# Patient Record
Sex: FEMALE | Race: WHITE | HISPANIC OR LATINO | Employment: UNEMPLOYED | URBAN - METROPOLITAN AREA
[De-identification: names, ages, dates, MRNs, and addresses within clinical notes are randomized per-mention and may not be internally consistent; named-entity substitution may affect disease eponyms.]

---

## 2019-07-28 ENCOUNTER — HOSPITAL ENCOUNTER (EMERGENCY)
Facility: HOSPITAL | Age: 69
Discharge: HOME | End: 2019-07-28
Attending: EMERGENCY MEDICINE
Payer: MEDICAID

## 2019-07-28 VITALS
WEIGHT: 155 LBS | SYSTOLIC BLOOD PRESSURE: 185 MMHG | BODY MASS INDEX: 27.46 KG/M2 | TEMPERATURE: 99 F | RESPIRATION RATE: 18 BRPM | HEIGHT: 63 IN | OXYGEN SATURATION: 100 % | DIASTOLIC BLOOD PRESSURE: 81 MMHG | HEART RATE: 76 BPM

## 2019-07-28 DIAGNOSIS — I10 ELEVATED BLOOD PRESSURE READING WITH DIAGNOSIS OF HYPERTENSION: ICD-10-CM

## 2019-07-28 DIAGNOSIS — R30.0 DYSURIA: Primary | ICD-10-CM

## 2019-07-28 LAB
ALBUMIN SERPL BCP-MCNC: 4.4 G/DL (ref 3.5–5.2)
ALP SERPL-CCNC: 147 U/L (ref 55–135)
ALT SERPL W/O P-5'-P-CCNC: 26 U/L (ref 10–44)
ANION GAP SERPL CALC-SCNC: 14 MMOL/L (ref 8–16)
AST SERPL-CCNC: 29 U/L (ref 10–40)
BACTERIA #/AREA URNS HPF: ABNORMAL /HPF
BASOPHILS # BLD AUTO: 0.02 K/UL (ref 0–0.2)
BASOPHILS NFR BLD: 0.3 % (ref 0–1.9)
BILIRUB SERPL-MCNC: 0.3 MG/DL (ref 0.1–1)
BILIRUB UR QL STRIP: NEGATIVE
BUN SERPL-MCNC: 11 MG/DL (ref 8–23)
CALCIUM SERPL-MCNC: 10.2 MG/DL (ref 8.7–10.5)
CHLORIDE SERPL-SCNC: 98 MMOL/L (ref 95–110)
CLARITY UR: CLEAR
CO2 SERPL-SCNC: 29 MMOL/L (ref 23–29)
COLOR UR: YELLOW
CREAT SERPL-MCNC: 0.7 MG/DL (ref 0.5–1.4)
DIFFERENTIAL METHOD: ABNORMAL
EOSINOPHIL # BLD AUTO: 0.1 K/UL (ref 0–0.5)
EOSINOPHIL NFR BLD: 0.8 % (ref 0–8)
ERYTHROCYTE [DISTWIDTH] IN BLOOD BY AUTOMATED COUNT: 12.8 % (ref 11.5–14.5)
EST. GFR  (AFRICAN AMERICAN): >60 ML/MIN/1.73 M^2
EST. GFR  (NON AFRICAN AMERICAN): >60 ML/MIN/1.73 M^2
GLUCOSE SERPL-MCNC: 132 MG/DL (ref 70–110)
GLUCOSE UR QL STRIP: NEGATIVE
HCT VFR BLD AUTO: 44.5 % (ref 37–48.5)
HGB BLD-MCNC: 13.9 G/DL (ref 12–16)
HGB UR QL STRIP: ABNORMAL
KETONES UR QL STRIP: NEGATIVE
LEUKOCYTE ESTERASE UR QL STRIP: NEGATIVE
LYMPHOCYTES # BLD AUTO: 2.2 K/UL (ref 1–4.8)
LYMPHOCYTES NFR BLD: 27.8 % (ref 18–48)
MCH RBC QN AUTO: 29.1 PG (ref 27–31)
MCHC RBC AUTO-ENTMCNC: 31.2 G/DL (ref 32–36)
MCV RBC AUTO: 93 FL (ref 82–98)
MICROSCOPIC COMMENT: ABNORMAL
MONOCYTES # BLD AUTO: 0.5 K/UL (ref 0.3–1)
MONOCYTES NFR BLD: 6 % (ref 4–15)
NEUTROPHILS # BLD AUTO: 5.2 K/UL (ref 1.8–7.7)
NEUTROPHILS NFR BLD: 65.1 % (ref 38–73)
NITRITE UR QL STRIP: NEGATIVE
PH UR STRIP: 7 [PH] (ref 5–8)
PLATELET # BLD AUTO: 413 K/UL (ref 150–350)
PMV BLD AUTO: 8.7 FL (ref 9.2–12.9)
POTASSIUM SERPL-SCNC: 4 MMOL/L (ref 3.5–5.1)
PROT SERPL-MCNC: 7.6 G/DL (ref 6–8.4)
PROT UR QL STRIP: NEGATIVE
RBC # BLD AUTO: 4.78 M/UL (ref 4–5.4)
RBC #/AREA URNS HPF: 6 /HPF (ref 0–4)
SODIUM SERPL-SCNC: 141 MMOL/L (ref 136–145)
SP GR UR STRIP: 1.01 (ref 1–1.03)
URN SPEC COLLECT METH UR: ABNORMAL
UROBILINOGEN UR STRIP-ACNC: NEGATIVE EU/DL
WBC # BLD AUTO: 7.98 K/UL (ref 3.9–12.7)
WBC #/AREA URNS HPF: 0 /HPF (ref 0–5)

## 2019-07-28 PROCEDURE — 80053 COMPREHEN METABOLIC PANEL: CPT

## 2019-07-28 PROCEDURE — 25000003 PHARM REV CODE 250: Performed by: NURSE PRACTITIONER

## 2019-07-28 PROCEDURE — 96374 THER/PROPH/DIAG INJ IV PUSH: CPT

## 2019-07-28 PROCEDURE — 63600175 PHARM REV CODE 636 W HCPCS: Performed by: NURSE PRACTITIONER

## 2019-07-28 PROCEDURE — 99284 EMERGENCY DEPT VISIT MOD MDM: CPT | Mod: 25

## 2019-07-28 PROCEDURE — 85025 COMPLETE CBC W/AUTO DIFF WBC: CPT

## 2019-07-28 PROCEDURE — 81000 URINALYSIS NONAUTO W/SCOPE: CPT

## 2019-07-28 PROCEDURE — 96361 HYDRATE IV INFUSION ADD-ON: CPT

## 2019-07-28 RX ORDER — KETOROLAC TROMETHAMINE 30 MG/ML
15 INJECTION, SOLUTION INTRAMUSCULAR; INTRAVENOUS
Status: COMPLETED | OUTPATIENT
Start: 2019-07-28 | End: 2019-07-28

## 2019-07-28 RX ORDER — CEPHALEXIN 500 MG/1
500 CAPSULE ORAL EVERY 6 HOURS
Qty: 28 CAPSULE | Refills: 0 | Status: SHIPPED | OUTPATIENT
Start: 2019-07-28 | End: 2019-08-04

## 2019-07-28 RX ORDER — TRAMADOL HYDROCHLORIDE 50 MG/1
50 TABLET ORAL EVERY 6 HOURS PRN
Qty: 12 TABLET | Refills: 0 | Status: SHIPPED | OUTPATIENT
Start: 2019-07-28 | End: 2019-08-07

## 2019-07-28 RX ORDER — PHENAZOPYRIDINE HYDROCHLORIDE 100 MG/1
200 TABLET, FILM COATED ORAL
Status: COMPLETED | OUTPATIENT
Start: 2019-07-28 | End: 2019-07-28

## 2019-07-28 RX ADMIN — KETOROLAC TROMETHAMINE 15 MG: 30 INJECTION, SOLUTION INTRAMUSCULAR at 12:07

## 2019-07-28 RX ADMIN — SODIUM CHLORIDE 1000 ML: 0.9 INJECTION, SOLUTION INTRAVENOUS at 12:07

## 2019-07-28 RX ADMIN — PHENAZOPYRIDINE HYDROCHLORIDE 200 MG: 100 TABLET ORAL at 11:07

## 2019-07-28 NOTE — DISCHARGE INSTRUCTIONS
Take all of prescribed antibiotic until complete.  Take prescribed pain medication as labeled as needed for pain. Follow-up with Urology in 2-3 days return to ED for any concerns or worsening symptoms.  Also please follow up with [facility] in approximately 2-3 days for blood pressure recheck and keep a daily recording of blood pressure readings.

## 2019-07-28 NOTE — ED TRIAGE NOTES
Patient's father Marcus picked prescription up 11.27 at 11:45am   Pt presents to ED with C/O pain and burning on urination with onset x7 days ago and vaginal bleeding with pelvic pain. Pt reports that she was diagnoses with a vaginal cystocele 5 years ago. Pt is Occitan speaking and wants her friend with her to translate and refuses  provided by the hospital.

## 2019-07-28 NOTE — ED PROVIDER NOTES
Encounter Date: 7/28/2019       History     Chief Complaint   Patient presents with    Urinary Tract Infection     Pt reports that she has blood in her urine, painful urination, pelvic pain. Pt reports that she was diagnoses with a vaginal cystocele 5 years ago. Pt is Malagasy speaking and wants her friend with her to translate and refuses  provided by the hospital.      Patient is 68-year-old female past medical history of hypertension who presents ED for evaluation of urinary tract infection for approximately a week.  Patient denies vaginal bleeding as previously reported to nurse.  Patient does report that there was blood in the toilet after she urinated yesterday but is unsure where it came from and states that it has resolved at this time.  Patient states that she was diagnosed with vaginal cyst is still approximately 5 years ago in [place].  Patient also reports history of kidney stones.  Associates lower abdominal pain, pelvic pain, decreased urine volume, hematuria, and difficulty urinating.  Denies vaginal pain/odor/discharge.  Patient reports taking azo last week with no improvement and states that she was taking her friend's amoxicillin.  Patient also reports that she has been taking her friend's losartan for blood pressure and did take some this morning but is unsure of the dose.  Denies chest pain or shortness of breath.  Denies any alleviating or exacerbating factors.  Denies fever, chills, neck pain/stiffness, N/V/D, back pain, or any other concerns.    The history is provided by the patient and a relative. The history is limited by a language barrier. A  was used (Ukrainian speaking, Professional  offered, patient declines and wants family member to interpret.).     Review of patient's allergies indicates:  No Known Allergies  Past Medical History:   Diagnosis Date    Hypertension      Past Surgical History:   Procedure Laterality Date    TUBAL LIGATION        History reviewed. No pertinent family history.  Social History     Tobacco Use    Smoking status: Never Smoker    Smokeless tobacco: Never Used   Substance Use Topics    Alcohol use: Never     Frequency: Never    Drug use: Never     Review of Systems   Constitutional: Negative for chills and fever.   Respiratory: Negative for shortness of breath.    Cardiovascular: Negative for chest pain.   Gastrointestinal: Positive for abdominal pain (lower). Negative for diarrhea, nausea and vomiting.   Genitourinary: Positive for decreased urine volume, difficulty urinating, dysuria, hematuria and pelvic pain. Negative for vaginal bleeding, vaginal discharge and vaginal pain.   Musculoskeletal: Negative for back pain, neck pain and neck stiffness.   Hematological: Does not bruise/bleed easily.   All other systems reviewed and are negative.      Physical Exam     Initial Vitals [07/28/19 1039]   BP Pulse Resp Temp SpO2   (!) 190/104 93 18 98.6 °F (37 °C) 99 %      MAP       --         Physical Exam    Vitals reviewed.  Constitutional: She appears well-developed and well-nourished.  Non-toxic appearance. She does not have a sickly appearance.   HENT:   Head: Atraumatic.   Mouth/Throat: Oropharynx is clear and moist.   Eyes: EOM are normal.   Neck: Normal range of motion, full passive range of motion without pain and phonation normal. Neck supple.   Cardiovascular: Regular rhythm.   Asymptomatic hypertension.   Pulmonary/Chest: No respiratory distress.   Abdominal: Soft. Normal appearance and bowel sounds are normal. She exhibits no distension. There is tenderness (mild) in the suprapubic area. There is no rigidity, no rebound, no guarding and no CVA tenderness.   Musculoskeletal:   Back non-tender to palpation.   Neurological: She is alert and oriented to person, place, and time.   Skin: Skin is warm. No rash noted.   Psychiatric: She has a normal mood and affect.         ED Course   Procedures  Labs Reviewed    URINALYSIS, REFLEX TO URINE CULTURE - Abnormal; Notable for the following components:       Result Value    Occult Blood UA 1+ (*)     All other components within normal limits    Narrative:     Preferred Collection Type->Urine, Clean Catch   URINALYSIS MICROSCOPIC - Abnormal; Notable for the following components:    RBC, UA 6 (*)     All other components within normal limits    Narrative:     Preferred Collection Type->Urine, Clean Catch   CBC W/ AUTO DIFFERENTIAL - Abnormal; Notable for the following components:    Mean Corpuscular Hemoglobin Conc 31.2 (*)     Platelets 413 (*)     MPV 8.7 (*)     All other components within normal limits   COMPREHENSIVE METABOLIC PANEL - Abnormal; Notable for the following components:    Glucose 132 (*)     Alkaline Phosphatase 147 (*)     All other components within normal limits          Imaging Results          CT Renal Stone Study ABD Pelvis WO (Final result)  Result time 07/28/19 12:25:02    Final result by Brad Mason MD (07/28/19 12:25:02)                 Impression:      1. Left nonobstructive nephrolithiasis, no findings to suggest obstructive uropathy.  The urinary bladder is distended without wall thickening, correlation with any history of urinary retention or outlet obstruction recommended.  2. Possible high protein content or hemorrhagic cyst arising from the interpolar region/lower pole of the left kidney, ultrasound could be performed on a nonemergent basis to confirm cystic nature as warranted.  3. Hepatic steatosis, correlation with LFTs advised.  4. 2-3 mm pulmonary nodule within the right upper lobe.  For a solid nodule <6 mm, Fleischner Society 2017 guidelines recommend no routine follow up for a low risk patient, or follow-up with non-contrast chest CT at 12 months in a high risk patient.  5. Several additional findings above.      Electronically signed by: Brad Mason MD  Date:    07/28/2019  Time:    12:25             Narrative:     EXAMINATION:  CT RENAL STONE STUDY ABD PELVIS WO    CLINICAL HISTORY:  Hematuria;    TECHNIQUE:  Low dose axial images, sagittal and coronal reformations were obtained from the lung bases to the pubic symphysis.  Contrast was not administered.    COMPARISON:  None    FINDINGS:  Images of the lower thorax are remarkable for a 2-3 mm pulmonary nodule within the right upper lobe.    The liver is hypoattenuating, suggesting steatosis, correlation with LFTs recommended.  There is fatty sparing about the gallbladder fossa.  The spleen, pancreas, gallbladder and right adrenal gland are grossly unremarkable.  There is minimal nodular thickening of the left adrenal gland, nonspecific.  Pancreatic duct is not dilated.  No biliary dilation.  No ascites.  No significant abdominal lymphadenopathy.    There is left nonobstructive nephrolithiasis, no right nephrolithiasis.  No hydronephrosis.  A low attenuating lesion arises from the interpolar region of the left kidney, attenuation of which is slightly higher than would be expected for simple cyst, could reflect high protein content or hemorrhagic cyst.  This measures 2.4 cm.  The bilateral ureters are unremarkable without calculi seen.  The urinary bladder is unremarkable.  The uterus and adnexa is unremarkable.    There are scattered colonic diverticula without surrounding inflammation.  The terminal ileum and appendix are unremarkable.  The small bowel is grossly unremarkable.  There are a few scattered shotty periaortic and paracaval lymph nodes.  There is atherosclerotic calcification of the aorta and its branches.  No focal organized pelvic fluid collection.    Degenerative changes are noted of the spine.  There may be remote fracture involving the right sacral ala.  There is osteopenia.                                 Medical Decision Making:   History:   I obtained history from: someone other than patient.       <> Summary of History: Family member  Old Medical Records: I  decided to obtain old medical records.  Initial Assessment:   Patient presents ED for evaluation of urinary tract infection for approximately a week.  Patient denies vaginal bleeding as previously reported triage.  Appears well, nontoxic.  Afebrile.  Hypertensive in ED.  Denies symptoms of hypertension including:  Any vision changes, dizziness, headache, shortness of breath, or chest pain. MM moist.  Abdomen soft and mild suprapubic discomfort with palpation; no rebound, rigidity, or guarding.  No CVA tenderness.  Clinical Tests:   Lab Tests: Ordered and Reviewed  Radiological Study: Reviewed and Ordered  ED Management:  UA, labs, CT stone, IV fluids, toradol, PO pyridium   - UA shows 1+ occult blood and no signs of infection.  - UA microscopic shows 6 RBC.  - H&H within normal limits.  -  WBC within normal limits.  - Normal renal function.  - CT shows 1. Left nonobstructive nephrolithiasis, no findings to suggest obstructive uropathy.  The urinary bladder is distended without wall thickening, correlation with any history of urinary retention or outlet obstruction recommended. Possible high protein content or hemorrhagic cyst arising from the interpolar region/lower pole of the left kidney, ultrasound could be performed on a nonemergent basis to confirm cystic nature as warranted. Hepatic steatosis, correlation with LFTs advised. 2-3 mm pulmonary nodule within the right upper lobe.  For a solid nodule <6 mm, Fleischner Society 2017 guidelines recommend no routine follow up for a low risk patient, or follow-up with non-contrast chest CT at 12 months in a high risk patients.  - Patient informed about incidental findings and need for follow-up on CT.   - No signs of pyelonephritis, obstructive uropathy, or acute surgical abdomen.   - Although UA shows no signs of infection, patient is symptomatic and I will treat for UTI.  - Culture sent.  - Patient's blood pressure is elevated in ED today.    -I believe patient's  blood pressure is elevated due to medication noncompliance, patient's situation, and pain.  - I do not suspect ACS, hypertensive urgency or emergency.  - Advised patient to follow up with [facility] in approximately 2-3 days for blood pressure recheck and advised patient to keep a daily recording of blood pressure readings.  - Patient instructed to follow up with Urology and [facility] in 2-3 days and to return to ED for any concerns or worsening symptoms.  - Patient is hemodynamically stable and will be discharged home with prescription for Keflex.  - Ambulatory referral to [facility] and urology placed.   -  The pt is comfortable going home at this time.   -After taking into careful account the historical factors and physical exam findings of the patient's presentation today, in conjunction with the empirical and objective data obtained on ED workup, no acute emergent medical condition requiring admission has been identified.   -The patient appears to be low risk for an emergent medical condition and I feel it is safe and appropriate at this time for the patient to be discharged to follow-up as detailed in their discharge instructions for reevaluation and possible continued outpatient workup and management.  - I have discussed the specifics of the workup with the patient and the patient has verbalized understanding of the details of the workup, the diagnosis, the treatment plan, and the need for outpatient follow-up.    - The patient remained comfortable and stable during their visit in the ED.   - Discharge and follow-up instructions discussed with the patient who expressed understanding and willingness to comply with my recommendations with family member translating conversation at bedside.      This medical record was prepared using voice dictation software. There may be phonetic errors.                     ED Course as of Jul 28 1154   Sun Jul 28, 2019   1151 11:51 AM- Patient reassessed, no  acute distress and updated on plan of care.      [CH]      ED Course User Index  [CH] Raleigh Maldonado NP     Clinical Impression:       ICD-10-CM ICD-9-CM   1. Dysuria R30.0 788.1   2. Elevated blood pressure reading with diagnosis of hypertension I10 401.9                                Raleigh Maldonado NP  07/28/19 1920

## 2019-08-23 ENCOUNTER — HOSPITAL ENCOUNTER (EMERGENCY)
Facility: HOSPITAL | Age: 69
Discharge: HOME | End: 2019-08-23
Attending: EMERGENCY MEDICINE
Payer: MEDICAID

## 2019-08-23 VITALS
TEMPERATURE: 98 F | RESPIRATION RATE: 16 BRPM | OXYGEN SATURATION: 94 % | WEIGHT: 158 LBS | BODY MASS INDEX: 29.08 KG/M2 | SYSTOLIC BLOOD PRESSURE: 165 MMHG | HEIGHT: 62 IN | HEART RATE: 84 BPM | DIASTOLIC BLOOD PRESSURE: 72 MMHG

## 2019-08-23 DIAGNOSIS — M54.41 ACUTE BILATERAL LOW BACK PAIN WITH BILATERAL SCIATICA: Primary | ICD-10-CM

## 2019-08-23 DIAGNOSIS — R52 PAIN: ICD-10-CM

## 2019-08-23 DIAGNOSIS — I10 HYPERTENSION, UNSPECIFIED TYPE: ICD-10-CM

## 2019-08-23 DIAGNOSIS — M54.42 ACUTE BILATERAL LOW BACK PAIN WITH BILATERAL SCIATICA: Primary | ICD-10-CM

## 2019-08-23 PROCEDURE — 25000003 PHARM REV CODE 250: Performed by: PHYSICIAN ASSISTANT

## 2019-08-23 PROCEDURE — 99284 EMERGENCY DEPT VISIT MOD MDM: CPT | Mod: 25

## 2019-08-23 RX ORDER — TRAMADOL HYDROCHLORIDE 50 MG/1
50 TABLET ORAL EVERY 6 HOURS PRN
COMMUNITY
End: 2020-01-05

## 2019-08-23 RX ORDER — KETOROLAC TROMETHAMINE 10 MG/1
10 TABLET, FILM COATED ORAL
Status: COMPLETED | OUTPATIENT
Start: 2019-08-23 | End: 2019-08-23

## 2019-08-23 RX ORDER — IBUPROFEN 600 MG/1
600 TABLET ORAL EVERY 6 HOURS PRN
Qty: 20 TABLET | Refills: 0 | OUTPATIENT
Start: 2019-08-23 | End: 2019-12-26

## 2019-08-23 RX ORDER — LIDOCAINE 50 MG/G
1 PATCH TOPICAL DAILY
Qty: 15 PATCH | Refills: 0 | Status: SHIPPED | OUTPATIENT
Start: 2019-08-23 | End: 2020-01-05

## 2019-08-23 RX ORDER — METHOCARBAMOL 750 MG/1
1500 TABLET, FILM COATED ORAL
Status: DISCONTINUED | OUTPATIENT
Start: 2019-08-23 | End: 2019-08-23 | Stop reason: HOSPADM

## 2019-08-23 RX ADMIN — KETOROLAC TROMETHAMINE 10 MG: 10 TABLET, FILM COATED ORAL at 06:08

## 2019-08-23 NOTE — ED TRIAGE NOTES
69 Y/O F with CC of Back pain. Pt reports has had pain for a long time but since falling out of bed onto her buttocks 4 months ago has been having pain from her sacrum across bilateral hips and down lateral legs. Denies other complaints. Will continue to monitor.

## 2019-08-23 NOTE — ED PROVIDER NOTES
Encounter Date: 8/23/2019       History     Chief Complaint   Patient presents with    Back Pain     pt c/o intermittent lower back pain that radiates down bilateral legs since July 1st. Pt denies injury. Pt denies numbness, weakness, or loss of bowel or bladder.      Estella Hagen  68 y.o. female with PMH of HTN and emphysema presented to the ED with c/o buttock pain. Patient states that pain has been there for some time.  She is unable to articulate specific onset although does endorse a fall from bed approximately 4 months ago with direct impact to the buttock.  She does state that she had moderate amount of pain at that time.  She states that pain is located to the sacral region with occasional radiation to bilateral lower extremities. She describes the pain as a constant throbbing sensation with occasional sharp stabbing pain to bilateral lower extremities.  She denies any recent fall since the previously mentioned one.  She denies any fever, chills, numbness, tingling, weakness, unintentional weight loss, bowel or bladder incontinence, GI or  symptoms. She denies any improvement with Ultram.      The history is provided by the patient.     Review of patient's allergies indicates:  No Known Allergies  Past Medical History:   Diagnosis Date    Hypertension      Past Surgical History:   Procedure Laterality Date    TUBAL LIGATION       History reviewed. No pertinent family history.  Social History     Tobacco Use    Smoking status: Never Smoker    Smokeless tobacco: Never Used   Substance Use Topics    Alcohol use: Never     Frequency: Never    Drug use: Never     Review of Systems   Constitutional: Negative for chills and fever.   Respiratory: Negative for shortness of breath.    Cardiovascular: Negative for chest pain and leg swelling.   Gastrointestinal: Negative for abdominal distention, abdominal pain, constipation, diarrhea, nausea, rectal pain and vomiting.   Genitourinary: Negative for  dysuria, flank pain, pelvic pain, urgency, vaginal bleeding and vaginal discharge.   Musculoskeletal: Positive for arthralgias and back pain. Negative for gait problem, myalgias, neck pain and neck stiffness.   Skin: Negative for pallor and rash.   Neurological: Negative for weakness and numbness.   Hematological: Does not bruise/bleed easily.       Physical Exam     Initial Vitals [08/23/19 1657]   BP Pulse Resp Temp SpO2   (!) 165/72 84 16 98.2 °F (36.8 °C) (!) 94 %      MAP       --         Physical Exam    Nursing note and vitals reviewed.  Constitutional: Vital signs are normal. She appears well-developed and well-nourished. She is cooperative.  Non-toxic appearance. She does not appear ill. No distress.   HENT:   Head: Normocephalic and atraumatic.   Eyes: Conjunctivae and lids are normal.   Neck: Neck supple. No neck rigidity.   Cardiovascular: Normal rate.   Pulmonary/Chest: Breath sounds normal. No respiratory distress.   Abdominal: Soft. Normal appearance. There is no tenderness. There is no rigidity and no guarding.   Musculoskeletal: Normal range of motion.        Legs:  TTP of the sacral region with no obvious bony deformity or crepitus.  Negative straight leg raise.  No pain with palpation over the sciatic notch.  Distal pulses intact.  Sensation intact.    Neurological: She is alert and oriented to person, place, and time. GCS eye subscore is 4. GCS verbal subscore is 5. GCS motor subscore is 6.   Skin: Skin is warm, dry and intact. No rash noted.   Psychiatric: She has a normal mood and affect. Her speech is normal and behavior is normal. Thought content normal.         ED Course   Procedures  Labs Reviewed - No data to display       Imaging Results          X-Ray Sacrum And Coccyx (Final result)  Result time 08/23/19 18:38:01    Final result by Jules Aguirre MD (08/23/19 18:38:01)                 Impression:      No acute process.      Electronically signed by: Jules Aguirre  MD  Date:    08/23/2019  Time:    18:38             Narrative:    EXAMINATION:  XR SACRUM AND COCCYX    CLINICAL HISTORY:  Pain, unspecified    TECHNIQUE:  Two or three views of the sacrum and coccyx were performed.    COMPARISON:  CT scan of the abdomen and pelvis dated 07/28/2019.    FINDINGS:  The bone mineralization is within normal limits.  The neural arches are maintained.  The appearance of the sacrum and coccyx is unremarkable.  No cortical step-off is identified.    The visualized soft tissues are within normal limits.  No abnormal calcifications are identified.                                   Estella Hagen  68 y.o. female with PMH of HTN and emphysema presented to the ED with c/o buttock pain. Patient states that pain has been there for some time.  She is unable to articulate specific onset although does endorse a fall from bed approximately 4 months ago with direct impact to the buttock.  She does state that she had moderate amount of pain at that time.  She states that pain is located to the sacral region with occasional radiation to bilateral lower extremities. She describes the pain as a constant throbbing sensation with occasional sharp stabbing pain to bilateral lower extremities.  She denies any recent fall since the previously mentioned one.  She denies any fever, chills, numbness, tingling, weakness, unintentional weight loss, GI or  symptoms. She denies any improvement with Ultram.  ROS positive for back pain.  Physical exam reveals patient well appearing in no distress exhibiting smooth steady gait to room. FROM of neck and all extremities with strength 5/5 bilaterally. TTP of the sacral region with no obvious bony deformity or crepitus.  Negative straight leg raise.  No pain with palpation over the sciatic notch.  Distal pulses intact.  Sensation intact.  Lungs clear, heart regular rate and rhythm. Abdomen is soft and nontender with no rebound or rigidity.     DDX: back spasm,  fracture, dislocation    ED management:  Did review patient's renal CT from 07/19 with an incidental finding of right remote sacral ala fracture.  I did discuss the findings with patient and that this could be the etiology of her pain as she did have remote fall approximately 4 months ago.  Patient is requesting another image to evaluate for this area.  X-ray with no acute abnormality at this time. We did discuss NSAID's  to help reduce inflammation and a trial of lidocaine patch.  Muscle relaxant was ordered in the ED however patient states that she did take this at home. Encouraged rest, hot soaks and massage and that she should follow up with PCP should pain continue for outpatient evaluation    Impression/Plan: The primary encounter diagnosis was Acute bilateral low back pain with bilateral sciatica. Diagnoses of Pain and Hypertension, unspecified type were also pertinent to this visit.  Discharged with Motrin however patient feels that this makes her blood pressure elevated and states that she prefers Naprosyn.  She was instructed to take Naprosyn as directed.  Patient will follow up with Primary.  Patient cautioned on when to return to ED.  Pt. Understands and agrees with current treatment plan                      Clinical Impression:       ICD-10-CM ICD-9-CM   1. Acute bilateral low back pain with bilateral sciatica M54.42 724.2    M54.41 724.3   2. Pain R52 780.96   3. Hypertension, unspecified type I10 401.9                                ROSS Guillen  08/23/19 1917

## 2019-12-26 ENCOUNTER — HOSPITAL ENCOUNTER (EMERGENCY)
Facility: HOSPITAL | Age: 69
Discharge: HOME | End: 2019-12-26
Attending: EMERGENCY MEDICINE
Payer: MEDICARE

## 2019-12-26 VITALS
RESPIRATION RATE: 18 BRPM | WEIGHT: 154 LBS | HEART RATE: 91 BPM | DIASTOLIC BLOOD PRESSURE: 76 MMHG | BODY MASS INDEX: 28.17 KG/M2 | SYSTOLIC BLOOD PRESSURE: 167 MMHG | OXYGEN SATURATION: 96 % | TEMPERATURE: 99 F

## 2019-12-26 DIAGNOSIS — M54.32 SCIATICA OF LEFT SIDE: Primary | ICD-10-CM

## 2019-12-26 LAB
AMORPH CRY URNS QL MICRO: NORMAL
BACTERIA #/AREA URNS HPF: NORMAL /HPF
BILIRUB UR QL STRIP: NEGATIVE
CLARITY UR: CLEAR
COLOR UR: YELLOW
GLUCOSE UR QL STRIP: NEGATIVE
HGB UR QL STRIP: ABNORMAL
KETONES UR QL STRIP: NEGATIVE
LEUKOCYTE ESTERASE UR QL STRIP: ABNORMAL
MICROSCOPIC COMMENT: NORMAL
NITRITE UR QL STRIP: NEGATIVE
PH UR STRIP: 7 [PH] (ref 5–8)
PROT UR QL STRIP: NEGATIVE
RBC #/AREA URNS HPF: 2 /HPF (ref 0–4)
SP GR UR STRIP: 1.01 (ref 1–1.03)
URN SPEC COLLECT METH UR: ABNORMAL
UROBILINOGEN UR STRIP-ACNC: NEGATIVE EU/DL
WBC #/AREA URNS HPF: 2 /HPF (ref 0–5)

## 2019-12-26 PROCEDURE — 25000003 PHARM REV CODE 250: Performed by: EMERGENCY MEDICINE

## 2019-12-26 PROCEDURE — 99284 EMERGENCY DEPT VISIT MOD MDM: CPT | Mod: 25

## 2019-12-26 PROCEDURE — 96372 THER/PROPH/DIAG INJ SC/IM: CPT

## 2019-12-26 PROCEDURE — 81000 URINALYSIS NONAUTO W/SCOPE: CPT

## 2019-12-26 PROCEDURE — 63600175 PHARM REV CODE 636 W HCPCS: Performed by: EMERGENCY MEDICINE

## 2019-12-26 RX ORDER — IBUPROFEN 600 MG/1
600 TABLET ORAL EVERY 6 HOURS PRN
Qty: 20 TABLET | Refills: 0 | Status: SHIPPED | OUTPATIENT
Start: 2019-12-26 | End: 2020-01-05

## 2019-12-26 RX ORDER — METHOCARBAMOL 500 MG/1
1000 TABLET, FILM COATED ORAL
Status: COMPLETED | OUTPATIENT
Start: 2019-12-26 | End: 2019-12-26

## 2019-12-26 RX ORDER — METHOCARBAMOL 750 MG/1
1500 TABLET, FILM COATED ORAL 3 TIMES DAILY
Qty: 30 TABLET | Refills: 0 | Status: SHIPPED | OUTPATIENT
Start: 2019-12-26 | End: 2019-12-31

## 2019-12-26 RX ORDER — KETOROLAC TROMETHAMINE 30 MG/ML
30 INJECTION, SOLUTION INTRAMUSCULAR; INTRAVENOUS
Status: COMPLETED | OUTPATIENT
Start: 2019-12-26 | End: 2019-12-26

## 2019-12-26 RX ADMIN — KETOROLAC TROMETHAMINE 30 MG: 30 INJECTION, SOLUTION INTRAMUSCULAR at 06:12

## 2019-12-26 RX ADMIN — METHOCARBAMOL TABLETS 1000 MG: 500 TABLET, COATED ORAL at 06:12

## 2019-12-26 NOTE — ED NOTES
Pt presents to the Ed w/ c/ of lower back pain near buttocks that radiates down left leg to her knee. Pt reports that she was seen 1 month ago for same symptoms and was told she had sciatica. Pt reports that a few days ago the back pain worsened. Denies urinary problems. Pt reports that pain radiates to lower abdomen.

## 2019-12-26 NOTE — ED PROVIDER NOTES
Encounter Date: 12/26/2019    SCRIBE #1 NOTE: I, Devora Coburn, am scribing for, and in the presence of,  Dr. Johnson. I have scribed the entire note.       History     Chief Complaint   Patient presents with    Back Pain     3 days ago began with lower back pain, denies any urinary problems      This is a 69 y.o. female who  has a past medical history of Hypertension. presents with chief complaint of lower back pain that began a last night. Pain is located to left lower back, radiating down to the knee and to lower abdomen. Patient denies any fever, chills, nausea, vomiting, dysuria or hematuria. There has been no recent trauma or injury. Patient notes she was seen in ED in the past for similar complaints.     The history is provided by the patient. The history is limited by a language barrier. A  was used.     Review of patient's allergies indicates:  No Known Allergies  Past Medical History:   Diagnosis Date    Hypertension      Past Surgical History:   Procedure Laterality Date    TUBAL LIGATION       No family history on file.  Social History     Tobacco Use    Smoking status: Never Smoker    Smokeless tobacco: Never Used   Substance Use Topics    Alcohol use: Never     Frequency: Never    Drug use: Never     Review of Systems   Gastrointestinal: Positive for abdominal pain.   Musculoskeletal: Positive for back pain.   All other systems reviewed and are negative.      Physical Exam     Initial Vitals [12/26/19 1653]   BP Pulse Resp Temp SpO2   (!) 167/76 91 18 98.5 °F (36.9 °C) 96 %      MAP       --         Physical Exam    Nursing note and vitals reviewed.  Constitutional: She appears well-developed and well-nourished. No distress.   HENT:   Head: Normocephalic and atraumatic.   Mouth/Throat: Oropharynx is clear and moist.   Eyes: Conjunctivae and EOM are normal. Pupils are equal, round, and reactive to light.   Neck: Normal range of motion. Neck supple.   Cardiovascular: Normal rate,  regular rhythm, normal heart sounds and intact distal pulses.   Pulmonary/Chest: Breath sounds normal. No respiratory distress. She has no wheezes. She has no rhonchi. She has no rales.   Abdominal: Soft. Bowel sounds are normal. She exhibits no distension. There is no tenderness.   Musculoskeletal: Normal range of motion. She exhibits no edema or tenderness.   Tenderness of left upper buttock.  No vertebral tenderness.   Neurological: She is alert and oriented to person, place, and time. She has normal strength. No cranial nerve deficit.   Skin: Skin is warm and dry. Capillary refill takes less than 2 seconds.         ED Course   Procedures  Labs Reviewed   URINALYSIS - Abnormal; Notable for the following components:       Result Value    Occult Blood UA Trace (*)     Leukocytes, UA Trace (*)     All other components within normal limits   URINALYSIS MICROSCOPIC             Medical Decision Making:   ED Management:  69-year-old female with left-sided sciatica.  Urinalysis is unremarkable. I have reviewed her x-rays from a prior visit for the same complaint.  She was given a shot of Toradol and Robaxin here in the ED.  I will discharge her to home with prescriptions for ibuprofen and Robaxin.  I have urged close follow-up with a primary physician but also to return to the ED for any worsening.                                 Clinical Impression:       ICD-10-CM ICD-9-CM   1. Sciatica of left side M54.32 724.3             I, Dr. Mika Johnson, personally performed the services described in this documentation. All medical record entries made by the scribe were at my direction and in my presence. I have reviewed the chart and agree that the record reflects my personal performance and is accurate and complete. Mika Johnson MD.  7:09 PM 12/26/2019                   Mika Johnson MD  12/26/19 1910

## 2020-01-05 ENCOUNTER — HOSPITAL ENCOUNTER (EMERGENCY)
Facility: HOSPITAL | Age: 70
Discharge: HOME | End: 2020-01-05
Attending: EMERGENCY MEDICINE
Payer: MEDICARE

## 2020-01-05 VITALS
OXYGEN SATURATION: 93 % | DIASTOLIC BLOOD PRESSURE: 77 MMHG | SYSTOLIC BLOOD PRESSURE: 121 MMHG | BODY MASS INDEX: 28.34 KG/M2 | HEIGHT: 62 IN | TEMPERATURE: 100 F | RESPIRATION RATE: 19 BRPM | HEART RATE: 69 BPM | WEIGHT: 154 LBS

## 2020-01-05 DIAGNOSIS — R07.9 CHEST PAIN: ICD-10-CM

## 2020-01-05 DIAGNOSIS — I10 HYPERTENSION, UNSPECIFIED TYPE: Primary | ICD-10-CM

## 2020-01-05 LAB
ALBUMIN SERPL BCP-MCNC: 4.6 G/DL (ref 3.5–5.2)
ALP SERPL-CCNC: 107 U/L (ref 55–135)
ALT SERPL W/O P-5'-P-CCNC: 10 U/L (ref 10–44)
ANION GAP SERPL CALC-SCNC: 13 MMOL/L (ref 8–16)
AST SERPL-CCNC: 17 U/L (ref 10–40)
BACTERIA #/AREA URNS HPF: ABNORMAL /HPF
BASOPHILS # BLD AUTO: 0.03 K/UL (ref 0–0.2)
BASOPHILS NFR BLD: 0.5 % (ref 0–1.9)
BILIRUB SERPL-MCNC: 0.5 MG/DL (ref 0.1–1)
BILIRUB UR QL STRIP: NEGATIVE
BUN SERPL-MCNC: 13 MG/DL (ref 8–23)
CALCIUM SERPL-MCNC: 10.1 MG/DL (ref 8.7–10.5)
CHLORIDE SERPL-SCNC: 102 MMOL/L (ref 95–110)
CLARITY UR: CLEAR
CO2 SERPL-SCNC: 25 MMOL/L (ref 23–29)
COLOR UR: YELLOW
CREAT SERPL-MCNC: 0.7 MG/DL (ref 0.5–1.4)
DIFFERENTIAL METHOD: NORMAL
EOSINOPHIL # BLD AUTO: 0 K/UL (ref 0–0.5)
EOSINOPHIL NFR BLD: 0.5 % (ref 0–8)
ERYTHROCYTE [DISTWIDTH] IN BLOOD BY AUTOMATED COUNT: 12.9 % (ref 11.5–14.5)
EST. GFR  (AFRICAN AMERICAN): >60 ML/MIN/1.73 M^2
EST. GFR  (NON AFRICAN AMERICAN): >60 ML/MIN/1.73 M^2
GLUCOSE SERPL-MCNC: 187 MG/DL (ref 70–110)
GLUCOSE UR QL STRIP: NEGATIVE
HCT VFR BLD AUTO: 44.3 % (ref 37–48.5)
HGB BLD-MCNC: 14.5 G/DL (ref 12–16)
HGB UR QL STRIP: ABNORMAL
KETONES UR QL STRIP: NEGATIVE
LEUKOCYTE ESTERASE UR QL STRIP: ABNORMAL
LYMPHOCYTES # BLD AUTO: 2 K/UL (ref 1–4.8)
LYMPHOCYTES NFR BLD: 33.3 % (ref 18–48)
MCH RBC QN AUTO: 29.7 PG (ref 27–31)
MCHC RBC AUTO-ENTMCNC: 32.7 G/DL (ref 32–36)
MCV RBC AUTO: 91 FL (ref 82–98)
MICROSCOPIC COMMENT: ABNORMAL
MONOCYTES # BLD AUTO: 0.4 K/UL (ref 0.3–1)
MONOCYTES NFR BLD: 7.2 % (ref 4–15)
NEUTROPHILS # BLD AUTO: 3.6 K/UL (ref 1.8–7.7)
NEUTROPHILS NFR BLD: 58.5 % (ref 38–73)
NITRITE UR QL STRIP: NEGATIVE
NON-SQ EPI CELLS #/AREA URNS HPF: 1 /HPF
PH UR STRIP: 7 [PH] (ref 5–8)
PLATELET # BLD AUTO: 332 K/UL (ref 150–350)
PMV BLD AUTO: 9.2 FL (ref 9.2–12.9)
POTASSIUM SERPL-SCNC: 4.1 MMOL/L (ref 3.5–5.1)
PROT SERPL-MCNC: 7.5 G/DL (ref 6–8.4)
PROT UR QL STRIP: NEGATIVE
RBC # BLD AUTO: 4.88 M/UL (ref 4–5.4)
RBC #/AREA URNS HPF: 2 /HPF (ref 0–4)
SODIUM SERPL-SCNC: 140 MMOL/L (ref 136–145)
SP GR UR STRIP: 1.01 (ref 1–1.03)
SQUAMOUS #/AREA URNS HPF: 2 /HPF
TROPONIN I SERPL DL<=0.01 NG/ML-MCNC: <0.006 NG/ML (ref 0–0.03)
TROPONIN I SERPL DL<=0.01 NG/ML-MCNC: <0.006 NG/ML (ref 0–0.03)
URN SPEC COLLECT METH UR: ABNORMAL
UROBILINOGEN UR STRIP-ACNC: NEGATIVE EU/DL
WBC # BLD AUTO: 6.12 K/UL (ref 3.9–12.7)
WBC #/AREA URNS HPF: 6 /HPF (ref 0–5)

## 2020-01-05 PROCEDURE — 85025 COMPLETE CBC W/AUTO DIFF WBC: CPT

## 2020-01-05 PROCEDURE — 93010 ELECTROCARDIOGRAM REPORT: CPT | Mod: ,,, | Performed by: INTERNAL MEDICINE

## 2020-01-05 PROCEDURE — 93010 EKG 12-LEAD: ICD-10-PCS | Mod: ,,, | Performed by: INTERNAL MEDICINE

## 2020-01-05 PROCEDURE — 81000 URINALYSIS NONAUTO W/SCOPE: CPT

## 2020-01-05 PROCEDURE — 93005 ELECTROCARDIOGRAM TRACING: CPT

## 2020-01-05 PROCEDURE — 80053 COMPREHEN METABOLIC PANEL: CPT

## 2020-01-05 PROCEDURE — 96374 THER/PROPH/DIAG INJ IV PUSH: CPT

## 2020-01-05 PROCEDURE — 99285 EMERGENCY DEPT VISIT HI MDM: CPT | Mod: 25

## 2020-01-05 PROCEDURE — 25000003 PHARM REV CODE 250: Performed by: EMERGENCY MEDICINE

## 2020-01-05 PROCEDURE — 84484 ASSAY OF TROPONIN QUANT: CPT

## 2020-01-05 RX ORDER — ASPIRIN 325 MG
325 TABLET ORAL
Status: COMPLETED | OUTPATIENT
Start: 2020-01-05 | End: 2020-01-05

## 2020-01-05 RX ORDER — LABETALOL HYDROCHLORIDE 5 MG/ML
10 INJECTION, SOLUTION INTRAVENOUS
Status: COMPLETED | OUTPATIENT
Start: 2020-01-05 | End: 2020-01-05

## 2020-01-05 RX ORDER — AMLODIPINE BESYLATE 5 MG/1
5 TABLET ORAL DAILY
Qty: 30 TABLET | Refills: 6 | Status: SHIPPED | OUTPATIENT
Start: 2020-01-05

## 2020-01-05 RX ADMIN — LABETALOL HYDROCHLORIDE 10 MG: 5 INJECTION INTRAVENOUS at 01:01

## 2020-01-05 RX ADMIN — ASPIRIN 325 MG ORAL TABLET 325 MG: 325 PILL ORAL at 12:01

## 2020-01-05 NOTE — ED PROVIDER NOTES
Encounter Date: 1/5/2020    SCRIBE #1 NOTE: I, Kaela Currie, am scribing for, and in the presence of,  Dr. Johnson . I have scribed the entire note.       History     Chief Complaint   Patient presents with    Chest Pain     Sharp, left sided chest pain that comes and goes over last 3 days. No cough. No N/V. + SOB with pain. Presents in no distress.      Time seen by provider: 11:41 AM    This is a 69 y.o. female who presents with complaint of intermittent mid-sternal CP that began three days ago. Sx's are described as a sharp pain that radiates to the left side of her chest. She endorses associated SOB, but denies any nausea or vomiting. Pt notes resolvement of symptoms at this time. Patient has a past medical history of DM and HTN.     The history is provided by the patient. The history is limited by a language barrier (staff present for [place]ian interpretation ).     Review of patient's allergies indicates:  No Known Allergies  Past Medical History:   Diagnosis Date    Diabetes mellitus     Hypertension      Past Surgical History:   Procedure Laterality Date    TUBAL LIGATION       History reviewed. No pertinent family history.  Social History     Tobacco Use    Smoking status: Never Smoker    Smokeless tobacco: Never Used   Substance Use Topics    Alcohol use: Never     Frequency: Never    Drug use: Never     Review of Systems   Constitutional: Negative for chills, diaphoresis and fever.   HENT: Negative for congestion, rhinorrhea and sore throat.    Eyes: Negative for redness and visual disturbance.   Respiratory: Positive for shortness of breath. Negative for cough and wheezing.    Cardiovascular: Positive for chest pain. Negative for palpitations.   Gastrointestinal: Negative for abdominal pain, diarrhea, nausea and vomiting.   Genitourinary: Negative for dysuria and hematuria.   Musculoskeletal: Negative for back pain, myalgias and neck pain.   Skin: Negative for rash.   Neurological: Negative for  dizziness, weakness and light-headedness.   Psychiatric/Behavioral: Negative for confusion.       Physical Exam     Initial Vitals   BP Pulse Resp Temp SpO2   01/05/20 1141 01/05/20 1141 01/05/20 1142 01/05/20 1142 01/05/20 1141   (!) 183/87 95 16 99.9 °F (37.7 °C) 97 %      MAP       --                Physical Exam    Nursing note and vitals reviewed.  Constitutional: She appears well-developed and well-nourished. She is not diaphoretic. No distress.   HENT:   Head: Normocephalic and atraumatic.   Eyes: Conjunctivae are normal. Pupils are equal, round, and reactive to light.   Cardiovascular: Normal rate, regular rhythm and normal heart sounds.   No murmur heard.  Pulmonary/Chest: Breath sounds normal. No respiratory distress. She has no wheezes.   Abdominal: Soft. She exhibits no distension. There is no tenderness.   Musculoskeletal: She exhibits no edema or tenderness.   Neurological: She is alert and oriented to person, place, and time.   Skin: Skin is warm and dry.         ED Course   Procedures  Labs Reviewed   COMPREHENSIVE METABOLIC PANEL - Abnormal; Notable for the following components:       Result Value    Glucose 187 (*)     All other components within normal limits   URINALYSIS, REFLEX TO URINE CULTURE - Abnormal; Notable for the following components:    Occult Blood UA Trace (*)     Leukocytes, UA 1+ (*)     All other components within normal limits    Narrative:     Preferred Collection Type->Urine, Clean Catch   URINALYSIS MICROSCOPIC - Abnormal; Notable for the following components:    WBC, UA 6 (*)     Non-Squam Epith 1 (*)     All other components within normal limits    Narrative:     Preferred Collection Type->Urine, Clean Catch   CBC W/ AUTO DIFFERENTIAL   TROPONIN I   TROPONIN I     EKG Readings: (Independently Interpreted)   NSR; rate of 94 bpm; NSR; no ST elevation; no STEMI       Imaging Results          X-Ray Chest 1 View (Final result)  Result time 01/05/20 12:18:32    Final result by  Karmen Mac MD (01/05/20 12:18:32)                 Impression:      Normal exam.      Electronically signed by: Karmen Mac MD  Date:    01/05/2020  Time:    12:18             Narrative:    EXAMINATION:  XR CHEST 1 VIEW    CLINICAL HISTORY:  chest pain;    TECHNIQUE:  Single frontal view of the chest was performed.    COMPARISON:  None    FINDINGS:  The lungs are symmetrically inflated with no mass, nodule, pneumothorax, airspace consolidation or pleural effusion.  The cardiomediastinal silhouette, osseous and soft tissue structures are normal.                                 Medical Decision Making:   Clinical Tests:   Lab Tests: Ordered and Reviewed  Radiological Study: Ordered and Reviewed  Medical Tests: Ordered and Reviewed  ED Management:  69-year-old female with chest pain. No chest pain during her ED stay.  EKG shows no acute injury pattern.  Troponins x2 are both 0.  Patient was noted to be hypertensive and tells me she has a history of hypertension but was taken off her for medication for this.  I will start her on 5 mg Norvasc once a day and have her follow up at the [facility] as soon as able for recheck and medication adjustment as warranted.  Patient has been informed also return to the ED for any recurring chest pain or any other concerns.                                 Clinical Impression:       ICD-10-CM ICD-9-CM   1. Hypertension, unspecified type I10 401.9   2. Chest pain R07.9 786.50       I, Dr. Mika Johnson, personally performed the services described in this documentation. All medical record entries made by the scribe were at my direction and in my presence. I have reviewed the chart and agree that the record reflects my personal performance and is accurate and complete. Mika Johnson MD.  3:08 PM 01/06/2020                        Mika Johnson MD  01/06/20 [id number]

## 2020-01-05 NOTE — ED NOTES
APPEARANCE: Alert, oriented and in no acute distress.  CARDIAC: Normal rate and rhythm, no murmur heard.   PERIPHERAL VASCULAR: peripheral pulses present. Normal cap refill. No edema. Warm to touch.    RESPIRATORY:Normal rate and effort, breath sounds clear bilaterally throughout chest. Respirations are equal and unlabored no obvious signs of distress.  GASTRO: soft, bowel sounds normal, no tenderness, no abdominal distention.  MUSC: Limited ROM due to aging process. No bony tenderness or soft tissue tenderness. No obvious deformity.  SKIN: Skin is warm and dry, normal skin turgor, mucous membranes moist.  NEURO: 5/5 strength major flexors/extensors bilaterally. Sensory intact to light touch bilaterally. Jessa coma scale: eyes open spontaneously-4, oriented & converses-5, obeys commands-6. No neurological abnormalities.   MENTAL STATUS: awake, alert and aware of environment.  EYE: PERRL, both eyes: pupils brisk and reactive to light. Normal size.  ENT: EARS: no obvious drainage. NOSE: no active bleeding.   Pt reports intermittent chest pain x 3 days.